# Patient Record
Sex: FEMALE | Race: WHITE
[De-identification: names, ages, dates, MRNs, and addresses within clinical notes are randomized per-mention and may not be internally consistent; named-entity substitution may affect disease eponyms.]

---

## 2021-11-22 ENCOUNTER — HOSPITAL ENCOUNTER (EMERGENCY)
Dept: HOSPITAL 4 - SED | Age: 81
LOS: 1 days | Discharge: HOME | End: 2021-11-23
Payer: COMMERCIAL

## 2021-11-22 VITALS — BODY MASS INDEX: 28.17 KG/M2 | HEIGHT: 64 IN | WEIGHT: 165 LBS

## 2021-11-22 VITALS — SYSTOLIC BLOOD PRESSURE: 175 MMHG

## 2021-11-22 DIAGNOSIS — Z88.2: ICD-10-CM

## 2021-11-22 DIAGNOSIS — I10: ICD-10-CM

## 2021-11-22 DIAGNOSIS — M54.50: Primary | ICD-10-CM

## 2021-11-22 DIAGNOSIS — Z88.0: ICD-10-CM

## 2021-11-22 DIAGNOSIS — Z88.1: ICD-10-CM

## 2021-11-22 LAB
ALBUMIN SERPL BCP-MCNC: 3.4 G/DL (ref 3.4–4.8)
ALT SERPL W P-5'-P-CCNC: 27 U/L (ref 12–78)
ANION GAP SERPL CALCULATED.3IONS-SCNC: 5 MMOL/L (ref 5–15)
APPEARANCE UR: CLEAR
AST SERPL W P-5'-P-CCNC: 20 U/L (ref 10–37)
BASOPHILS # BLD AUTO: 0.1 K/UL (ref 0–0.2)
BASOPHILS NFR BLD AUTO: 0.6 % (ref 0–2)
BILIRUB SERPL-MCNC: 0.3 MG/DL (ref 0–1)
BILIRUB UR QL STRIP: NEGATIVE
BUN SERPL-MCNC: 13 MG/DL (ref 8–21)
CALCIUM SERPL-MCNC: 8.4 MG/DL (ref 8.4–11)
CHLORIDE SERPL-SCNC: 99 MMOL/L (ref 98–107)
COLOR UR: YELLOW
CREAT SERPL-MCNC: 0.53 MG/DL (ref 0.55–1.3)
EOSINOPHIL # BLD AUTO: 0.1 K/UL (ref 0–0.4)
EOSINOPHIL NFR BLD AUTO: 0.7 % (ref 0–4)
ERYTHROCYTE [DISTWIDTH] IN BLOOD BY AUTOMATED COUNT: 15.7 % (ref 9–15)
GFR SERPL CREATININE-BSD FRML MDRD: (no result) ML/MIN (ref 90–?)
GLUCOSE SERPL-MCNC: 151 MG/DL (ref 70–99)
GLUCOSE UR STRIP-MCNC: NEGATIVE MG/DL
HCT VFR BLD AUTO: 38.6 % (ref 36–48)
HGB BLD-MCNC: 13.1 G/DL (ref 12–16)
HGB UR QL STRIP: NEGATIVE
KETONES UR STRIP-MCNC: NEGATIVE MG/DL
LEUKOCYTE ESTERASE UR QL STRIP: NEGATIVE
LYMPHOCYTES # BLD AUTO: 1.2 K/UL (ref 1–5.5)
LYMPHOCYTES NFR BLD AUTO: 13 % (ref 20.5–51.5)
MCH RBC QN AUTO: 31 PG (ref 27–31)
MCHC RBC AUTO-ENTMCNC: 34 % (ref 32–36)
MCV RBC AUTO: 92 FL (ref 79–98)
MONOCYTES # BLD MANUAL: 0.7 K/UL (ref 0–1)
MONOCYTES # BLD MANUAL: 7.7 % (ref 1.7–9.3)
NEUTROPHILS # BLD AUTO: 7.4 K/UL (ref 1.8–7.7)
NEUTROPHILS NFR BLD AUTO: 78 % (ref 40–70)
NITRITE UR QL STRIP: NEGATIVE
PH UR STRIP: 6 [PH] (ref 5–8)
PLATELET # BLD AUTO: 233 K/UL (ref 130–430)
POTASSIUM SERPL-SCNC: 3.9 MMOL/L (ref 3.5–5.1)
PROT UR QL STRIP: (no result)
RBC # BLD AUTO: 4.22 MIL/UL (ref 4.2–6.2)
SODIUM SERPLBLD-SCNC: 131 MMOL/L (ref 136–145)
SP GR UR STRIP: 1.02 (ref 1–1.03)
UROBILINOGEN UR STRIP-MCNC: 0.2 MG/DL (ref 0.2–1)
WBC # BLD AUTO: 9.5 K/UL (ref 4.8–10.8)

## 2021-11-22 PROCEDURE — 36415 COLL VENOUS BLD VENIPUNCTURE: CPT

## 2021-11-22 PROCEDURE — 85025 COMPLETE CBC W/AUTO DIFF WBC: CPT

## 2021-11-22 PROCEDURE — 99284 EMERGENCY DEPT VISIT MOD MDM: CPT

## 2021-11-22 PROCEDURE — 80053 COMPREHEN METABOLIC PANEL: CPT

## 2021-11-22 PROCEDURE — 96372 THER/PROPH/DIAG INJ SC/IM: CPT

## 2021-11-22 PROCEDURE — 76376 3D RENDER W/INTRP POSTPROCES: CPT

## 2021-11-22 PROCEDURE — 81003 URINALYSIS AUTO W/O SCOPE: CPT

## 2021-11-22 PROCEDURE — 72131 CT LUMBAR SPINE W/O DYE: CPT

## 2021-11-22 NOTE — NUR
Medicated w/ morphine 5mg im, flexeril 10mg po per MD orders. Will cont to 
monitor and observe for any adverse reaction. bed to low position sr up, 
continue to monitor.

## 2021-11-22 NOTE — NUR
Received patient to ER via EMS w/ c/o back pain and spasm which is chronic and 
normally relieved w/ a muscle relaxer but patient had a glass of wine and did 
not want to mix. Introduced self to patient, positioned for comfort. bed to low 
position sr up, continue to monitor. Patient resting quietly.  No acute 
distress noted.  Vital signs within normal range.

## 2021-11-23 VITALS — SYSTOLIC BLOOD PRESSURE: 166 MMHG

## 2021-11-23 NOTE — NUR
Patient given written and verbal discharge instructions and verbalizes 
understanding.  ER MD discussed with patient the results and treatment 
provided. Patient in stable condition. ID arm band removed. IV catheter removed 
intact and dressing applied, no active bleeding.

 patient educated on pain management and to follow up with PMD. Pain Scale .

Opportunity for questions provided and answered. Medication side effect fact 
sheet provided.

## 2021-11-23 NOTE — NUR
Patient resting quietly.  No acute distress noted.  Vital signs within normal 
range. no neuro focal deficits noted. continue to monitor.